# Patient Record
Sex: FEMALE | Race: BLACK OR AFRICAN AMERICAN | Employment: UNEMPLOYED | URBAN - METROPOLITAN AREA
[De-identification: names, ages, dates, MRNs, and addresses within clinical notes are randomized per-mention and may not be internally consistent; named-entity substitution may affect disease eponyms.]

---

## 2021-07-03 ENCOUNTER — HOSPITAL ENCOUNTER (EMERGENCY)
Age: 61
Discharge: HOME | End: 2021-07-03
Attending: EMERGENCY MEDICINE
Payer: COMMERCIAL

## 2021-07-03 VITALS
BODY MASS INDEX: 23.54 KG/M2 | SYSTOLIC BLOOD PRESSURE: 157 MMHG | HEIGHT: 67 IN | DIASTOLIC BLOOD PRESSURE: 77 MMHG | OXYGEN SATURATION: 99 % | TEMPERATURE: 97.9 F | WEIGHT: 150 LBS | HEART RATE: 65 BPM | RESPIRATION RATE: 15 BRPM

## 2021-07-03 DIAGNOSIS — M25.512 ACUTE PAIN OF LEFT SHOULDER: Primary | ICD-10-CM

## 2021-07-03 PROCEDURE — 99283 EMERGENCY DEPT VISIT LOW MDM: CPT

## 2021-07-03 PROCEDURE — 93005 ELECTROCARDIOGRAM TRACING: CPT | Performed by: PHYSICIAN ASSISTANT

## 2021-07-03 NOTE — ED PROVIDER NOTES
79-year-old female who presents to emergency room with chief complaint of left shoulder pain. She reports 4 days ago she was doing some yard work, has had some increasing shoulder pain since that time. She has been using 800 mg of ibuprofen every 4-6 hours with improvement of her symptoms. 6 she will be evaluated today because she woke up with worsening pain. Pain is located over her left shoulder blade. There is no associated trauma or falls. Denies fever, chills, chest pain, shortness of breath, radiating pain, palpitations, dizziness. The history is provided by the patient. Shoulder Pain          No past medical history on file. No past surgical history on file. No family history on file. Social History     Socioeconomic History    Marital status: LEGALLY      Spouse name: Not on file    Number of children: Not on file    Years of education: Not on file    Highest education level: Not on file   Occupational History    Not on file   Tobacco Use    Smoking status: Not on file   Substance and Sexual Activity    Alcohol use: Not on file    Drug use: Not on file    Sexual activity: Not on file   Other Topics Concern    Not on file   Social History Narrative    Not on file     Social Determinants of Health     Financial Resource Strain:     Difficulty of Paying Living Expenses:    Food Insecurity:     Worried About Running Out of Food in the Last Year:     [address] in the Last Year:    Transportation Needs:     Lack of Transportation (Medical):      Lack of Transportation (Non-Medical):    Physical Activity:     Days of Exercise per Week:     Minutes of Exercise per Session:    Stress:     Feeling of Stress :    Social Connections:     Frequency of Communication with Friends and Family:     Frequency of Social Gatherings with Friends and Family:     Attends Spiritism Services:     Active Member of Clubs or Organizations:     Attends Club or Organization Meetings:     Marital Status:    Intimate Partner Violence:     Fear of Current or Ex-Partner:     Emotionally Abused:     Physically Abused:     Sexually Abused: ALLERGIES: Patient has no known allergies. Review of Systems   Constitutional: Negative for chills and fever. HENT: Negative for facial swelling. Respiratory: Negative for chest tightness and shortness of breath. Cardiovascular: Negative for chest pain. Gastrointestinal: Negative for abdominal pain, nausea and vomiting. Musculoskeletal: Positive for arthralgias. Negative for myalgias. Neurological: Negative for headaches. Psychiatric/Behavioral: Negative for confusion. All other systems reviewed and are negative. Vitals:    07/03/21 0956   BP: 137/82   Pulse: 60   Resp: 16   Temp: 97.9 °F (36.6 °C)   SpO2: 100%   Weight: 68 kg (150 lb)   Height: 5' 7\" (1.702 m)            Physical Exam  Vitals and nursing note reviewed. Constitutional:       Appearance: Normal appearance. HENT:      Head: Normocephalic and atraumatic. Mouth/Throat:      Mouth: Mucous membranes are moist.   Eyes:      Pupils: Pupils are equal, round, and reactive to light. Cardiovascular:      Rate and Rhythm: Normal rate and regular rhythm. Pulmonary:      Effort: No respiratory distress. Breath sounds: Normal breath sounds. Abdominal:      Palpations: Abdomen is soft. Tenderness: There is no abdominal tenderness. There is no guarding. Musculoskeletal:      Right shoulder: Normal.      Left shoulder: Tenderness and bony tenderness present. No swelling or deformity. Skin:     General: Skin is warm and dry. Neurological:      Mental Status: She is alert and oriented to person, place, and time. Mental status is at baseline.    Psychiatric:         Mood and Affect: Mood normal.          MDM  Number of Diagnoses or Management Options  Diagnosis management comments: 62-year-old female who presented after yard work several days ago, now has left shoulder pain. States she was worried because she had low blood dry heaving this morning. She is no identifiable risk factors for ACS. Today in the emergency room her EKG is unremarkable with no ischemic changes, no T wave inversion, no ST elevation or depression. Minimal concern for ACS in this patient. Patient has been using Tylenol at home with improvement of her symptoms. Advise continue treatment of Tylenol, ibuprofen, she states she has muscle relaxers at home. Advised to follow-up with her primary care physician. Risk of Complications, Morbidity, and/or Mortality  Presenting problems: low  Diagnostic procedures: low  Management options: low           EKG    Date/Time: 7/3/2021 10:17 AM  Performed by: WILD Castillo  Authorized by:  WILD Castillo     Interpretation:     Interpretation: normal    Rate:     ECG rate:  59    ECG rate assessment: normal    Rhythm:     Rhythm: sinus bradycardia    Ectopy:     Ectopy: none    QRS:     QRS axis:  Normal

## 2021-07-03 NOTE — ED TRIAGE NOTES
Pt ambulatory to triage. Pt states her left shoulder, ribs, and mid back have pain x few days. Denies fall or injury. Pt states iburprofen 800mg helped to ease pain. Pt denies any exacerbating factors. Pt describes as pulling and ache. Denies cough, fever, SOB.

## 2021-07-03 NOTE — ED NOTES
I have reviewed discharge instructions with the patient. The patient verbalized understanding. Patient left ED via Discharge Method: ambulatory to Home with self. Opportunity for questions and clarification provided. Patient given 0 scripts. To continue your aftercare when you leave the hospital, you may receive an automated call from our care team to check in on how you are doing. This is a free service and part of our promise to provide the best care and service to meet your aftercare needs.  If you have questions, or wish to unsubscribe from this service please call [phone]. Thank you for Choosing our [address] [place] [address] Emergency Department.

## 2021-07-04 LAB
ATRIAL RATE: 59 BPM
CALCULATED P AXIS, ECG09: 85 DEGREES
CALCULATED R AXIS, ECG10: 52 DEGREES
CALCULATED T AXIS, ECG11: 65 DEGREES
DIAGNOSIS, 93000: NORMAL
P-R INTERVAL, ECG05: 124 MS
Q-T INTERVAL, ECG07: 414 MS
QRS DURATION, ECG06: 76 MS
QTC CALCULATION (BEZET), ECG08: 409 MS
VENTRICULAR RATE, ECG03: 59 BPM

## 2022-04-29 ENCOUNTER — TRANSCRIBE ORDER (OUTPATIENT)
Dept: SCHEDULING | Age: 62
End: 2022-04-29

## 2022-05-25 ENCOUNTER — HOSPITAL ENCOUNTER (EMERGENCY)
Dept: GENERAL RADIOLOGY | Age: 62
Discharge: HOME | End: 2022-05-28
Payer: COMMERCIAL

## 2022-05-25 ENCOUNTER — HOSPITAL ENCOUNTER (EMERGENCY)
Age: 62
Discharge: HOME | End: 2022-05-25
Attending: EMERGENCY MEDICINE
Payer: COMMERCIAL

## 2022-05-25 VITALS
TEMPERATURE: 98.6 F | SYSTOLIC BLOOD PRESSURE: 128 MMHG | DIASTOLIC BLOOD PRESSURE: 69 MMHG | RESPIRATION RATE: 16 BRPM | HEART RATE: 63 BPM | OXYGEN SATURATION: 100 %

## 2022-05-25 DIAGNOSIS — M25.511 ACUTE PAIN OF RIGHT SHOULDER: ICD-10-CM

## 2022-05-25 DIAGNOSIS — S16.1XXA STRAIN OF NECK MUSCLE, INITIAL ENCOUNTER: ICD-10-CM

## 2022-05-25 DIAGNOSIS — R07.81 RIB PAIN ON RIGHT SIDE: ICD-10-CM
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
## 2022-05-25 PROCEDURE — 71101 X-RAY EXAM UNILAT RIBS/CHEST: CPT

## 2022-05-25 PROCEDURE — 73030 X-RAY EXAM OF SHOULDER: CPT

## 2022-05-25 PROCEDURE — 72040 X-RAY EXAM NECK SPINE 2-3 VW: CPT

## 2022-05-25 PROCEDURE — 99283 EMERGENCY DEPT VISIT LOW MDM: CPT

## 2022-05-25 RX ORDER — METHOCARBAMOL 750 MG/1
750 TABLET, FILM COATED ORAL 4 TIMES DAILY
Qty: 21 TABLET | Refills: 0 | Status: SHIPPED | OUTPATIENT
Start: 2022-05-25 | End: 2022-06-01

## 2022-05-25 ASSESSMENT — PAIN DESCRIPTION - LOCATION: LOCATION: NECK

## 2022-05-25 ASSESSMENT — PAIN SCALES - GENERAL: PAINLEVEL_OUTOF10: 6

## 2022-05-25 ASSESSMENT — PAIN - FUNCTIONAL ASSESSMENT: PAIN_FUNCTIONAL_ASSESSMENT: 0-10

## 2022-05-25 NOTE — ED NOTES
I have reviewed discharge instructions with the patient. The patient verbalized understanding. Patient left ED via Discharge Method: ambulatory to Home with self. Opportunity for questions and clarification provided. Patient given 1 scripts. To continue your aftercare when you leave the hospital, you may receive an automated call from our care team to check in on how you are doing. This is a free service and part of our promise to provide the best care and service to meet your aftercare needs.  If you have questions, or wish to unsubscribe from this service please call [phone]. Thank you for Choosing our [facility] Emergency Department.         Ilsa Yin, [address] [place] [address]  05/25/22 1535
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
## 2022-07-13 ENCOUNTER — OFFICE VISIT (OUTPATIENT)
Dept: ORTHOPEDIC SURGERY | Age: 62
End: 2022-07-13
Payer: COMMERCIAL

## 2022-07-13 DIAGNOSIS — M79.671 RIGHT FOOT PAIN: Primary | ICD-10-CM

## 2022-07-13 DIAGNOSIS — M20.21 HALLUX RIGIDUS OF RIGHT FOOT: ICD-10-CM

## 2022-07-13 PROCEDURE — 99214 OFFICE O/P EST MOD 30 MIN: CPT | Performed by: ORTHOPAEDIC SURGERY
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
## 2023-03-10 ENCOUNTER — HOSPITAL ENCOUNTER (EMERGENCY)
Age: 63
Discharge: HOME | End: 2023-03-10
Attending: EMERGENCY MEDICINE
Payer: COMMERCIAL

## 2023-03-10 VITALS
HEART RATE: 73 BPM | WEIGHT: 145 LBS | DIASTOLIC BLOOD PRESSURE: 77 MMHG | SYSTOLIC BLOOD PRESSURE: 156 MMHG | TEMPERATURE: 98.4 F | BODY MASS INDEX: 21.98 KG/M2 | RESPIRATION RATE: 18 BRPM | HEIGHT: 68 IN | OXYGEN SATURATION: 100 %

## 2023-03-10 DIAGNOSIS — R30.0 DYSURIA: Primary | ICD-10-CM

## 2023-03-10 DIAGNOSIS — M54.50 ACUTE BILATERAL LOW BACK PAIN WITHOUT SCIATICA: ICD-10-CM

## 2023-03-10 LAB
ALBUMIN SERPL-MCNC: 3.9 G/DL (ref 3.2–4.6)
ALBUMIN/GLOB SERPL: 1 (ref 0.4–1.6)
ALP SERPL-CCNC: 82 U/L (ref 50–136)
ALT SERPL-CCNC: 21 U/L (ref 12–65)
ANION GAP SERPL CALC-SCNC: 6 MMOL/L (ref 2–11)
APPEARANCE UR: CLEAR
AST SERPL-CCNC: 14 U/L (ref 15–37)
BACTERIA URNS QL MICRO: NEGATIVE /HPF
BASOPHILS # BLD: 0 K/UL (ref 0–0.2)
BASOPHILS NFR BLD: 1 % (ref 0–2)
BILIRUB SERPL-MCNC: 0.8 MG/DL (ref 0.2–1.1)
BILIRUB UR QL: NEGATIVE
BILIRUB UR QL: NEGATIVE
BUN SERPL-MCNC: 14 MG/DL (ref 8–23)
CALCIUM SERPL-MCNC: 9 MG/DL (ref 8.3–10.4)
CASTS URNS QL MICRO: ABNORMAL /LPF
CHLORIDE SERPL-SCNC: 108 MMOL/L (ref 101–110)
CO2 SERPL-SCNC: 28 MMOL/L (ref 21–32)
COLOR UR: ABNORMAL
CREAT SERPL-MCNC: 0.9 MG/DL (ref 0.6–1)
DIFFERENTIAL METHOD BLD: ABNORMAL
EOSINOPHIL # BLD: 0.1 K/UL (ref 0–0.8)
EOSINOPHIL NFR BLD: 2 % (ref 0.5–7.8)
EPI CELLS #/AREA URNS HPF: ABNORMAL /HPF
ERYTHROCYTE [DISTWIDTH] IN BLOOD BY AUTOMATED COUNT: 12.4 % (ref 11.9–14.6)
GLOBULIN SER CALC-MCNC: 4 G/DL (ref 2.8–4.5)
GLUCOSE SERPL-MCNC: 96 MG/DL (ref 65–100)
GLUCOSE UR QL STRIP.AUTO: NEGATIVE MG/DL
GLUCOSE UR STRIP.AUTO-MCNC: NEGATIVE MG/DL
HCT VFR BLD AUTO: 43.4 % (ref 35.8–46.3)
HGB BLD-MCNC: 13.6 G/DL (ref 11.7–15.4)
HGB UR QL STRIP: NEGATIVE
IMM GRANULOCYTES # BLD AUTO: 0 K/UL (ref 0–0.5)
IMM GRANULOCYTES NFR BLD AUTO: 0 % (ref 0–5)
KETONES UR QL STRIP.AUTO: NEGATIVE MG/DL
KETONES UR-MCNC: NEGATIVE MG/DL
LEUKOCYTE ESTERASE UR QL STRIP.AUTO: ABNORMAL
LEUKOCYTE ESTERASE UR QL STRIP: ABNORMAL
LYMPHOCYTES # BLD: 2.5 K/UL (ref 0.5–4.6)
LYMPHOCYTES NFR BLD: 47 % (ref 13–44)
MCH RBC QN AUTO: 30.9 PG (ref 26.1–32.9)
MCHC RBC AUTO-ENTMCNC: 31.3 G/DL (ref 31.4–35)
MCV RBC AUTO: 98.6 FL (ref 82–102)
MONOCYTES # BLD: 0.3 K/UL (ref 0.1–1.3)
MONOCYTES NFR BLD: 6 % (ref 4–12)
NEUTS SEG # BLD: 2.3 K/UL (ref 1.7–8.2)
NEUTS SEG NFR BLD: 44 % (ref 43–78)
NITRITE UR QL STRIP.AUTO: NEGATIVE
NITRITE UR QL: NEGATIVE
NRBC # BLD: 0 K/UL (ref 0–0.2)
PH UR STRIP: 7 (ref 5–9)
PH UR: 7 (ref 5–9)
PLATELET # BLD AUTO: 214 K/UL (ref 150–450)
PMV BLD AUTO: 9.2 FL (ref 9.4–12.3)
POTASSIUM SERPL-SCNC: 4.2 MMOL/L (ref 3.5–5.1)
PROT SERPL-MCNC: 7.9 G/DL (ref 6.3–8.2)
PROT UR QL: NEGATIVE MG/DL
PROT UR STRIP-MCNC: NEGATIVE MG/DL
RBC # BLD AUTO: 4.4 M/UL (ref 4.05–5.2)
RBC # UR STRIP: ABNORMAL
RBC #/AREA URNS HPF: ABNORMAL /HPF
SERVICE CMNT-IMP: ABNORMAL
SODIUM SERPL-SCNC: 142 MMOL/L (ref 133–143)
SP GR UR REFRACTOMETRY: 1.01 (ref 1–1.02)
SP GR UR: 1.01 (ref 1–1.02)
UROBILINOGEN UR QL STRIP.AUTO: 0.2 EU/DL (ref 0.2–1)
UROBILINOGEN UR QL: 0.2 EU/DL (ref 0.2–1)
WBC # BLD AUTO: 5.2 K/UL (ref 4.3–11.1)
WBC URNS QL MICRO: ABNORMAL /HPF

## 2023-03-10 PROCEDURE — 80053 COMPREHEN METABOLIC PANEL: CPT

## 2023-03-10 PROCEDURE — 87086 URINE CULTURE/COLONY COUNT: CPT

## 2023-03-10 PROCEDURE — 85025 COMPLETE CBC W/AUTO DIFF WBC: CPT

## 2023-03-10 PROCEDURE — 99283 EMERGENCY DEPT VISIT LOW MDM: CPT

## 2023-03-10 PROCEDURE — 81001 URINALYSIS AUTO W/SCOPE: CPT

## 2023-03-10 PROCEDURE — 81003 URINALYSIS AUTO W/O SCOPE: CPT

## 2023-03-10 PROCEDURE — 6370000000 HC RX 637 (ALT 250 FOR IP): Performed by: EMERGENCY MEDICINE

## 2023-03-10 RX ORDER — PHENAZOPYRIDINE HYDROCHLORIDE 100 MG/1
100 TABLET, FILM COATED ORAL 3 TIMES DAILY PRN
Qty: 9 TABLET | Refills: 0 | Status: SHIPPED | OUTPATIENT
Start: 2023-03-10 | End: 2023-03-13

## 2023-03-10 RX ORDER — PHENAZOPYRIDINE HYDROCHLORIDE 95 MG/1
95 TABLET ORAL
Status: COMPLETED | OUTPATIENT
Start: 2023-03-10 | End: 2023-03-10

## 2023-03-10 RX ORDER — CEPHALEXIN 500 MG/1
500 CAPSULE ORAL
Status: COMPLETED | OUTPATIENT
Start: 2023-03-10 | End: 2023-03-10

## 2023-03-10 RX ORDER — CEPHALEXIN 500 MG/1
500 CAPSULE ORAL 3 TIMES DAILY
Qty: 21 CAPSULE | Refills: 0 | Status: SHIPPED | OUTPATIENT
Start: 2023-03-10 | End: 2023-03-17

## 2023-03-10 RX ADMIN — CEPHALEXIN 500 MG: 500 CAPSULE ORAL at 11:14

## 2023-03-10 RX ADMIN — PHENAZOPYRIDINE HYDROCHLORIDE 95 MG: 95 TABLET ORAL at 11:14

## 2023-03-10 ASSESSMENT — PAIN SCALES - GENERAL: PAINLEVEL_OUTOF10: 8

## 2023-03-10 ASSESSMENT — PAIN - FUNCTIONAL ASSESSMENT: PAIN_FUNCTIONAL_ASSESSMENT: 0-10

## 2023-03-10 ASSESSMENT — ENCOUNTER SYMPTOMS: ABDOMINAL PAIN: 1

## 2023-03-10 NOTE — ED TRIAGE NOTES
Pt ambulatory to triage with CO bilateral low back pain without injury, radiating to abdomen and groin. CO dysuria x 1 week. Reports nausea no vomiting or diarrhea.  Denies fevers/chills

## 2023-03-10 NOTE — ED NOTES
I have reviewed discharge instructions with the patient and caregiver. The patient and caregiver verbalized understanding. Patient left ED via Discharge Method: ambulatory to Home with daughter. Opportunity for questions and clarification provided. Patient given 2 scripts. To continue your aftercare when you leave the hospital, you may receive an automated call from our care team to check in on how you are doing. This is a free service and part of our promise to provide the best care and service to meet your aftercare needs.  If you have questions, or wish to unsubscribe from this service please call [phone]. Thank you for Choosing our [facility] Emergency Department.         Darwin Pantoja RN  03/10/23 6586

## 2023-03-10 NOTE — ED PROVIDER NOTES
Emergency Department Provider Note                   PCP:                On File Not (Inactive)               Age: 59 y.o. Sex: female     DISPOSITION       No diagnosis found. MEDICAL DECISION MAKING  Complexity of Problems Addressed:  {Complexity:06317}    Data Reviewed and Analyzed:  Category 1:   {external source:35281}  I ordered each unique test.  I reviewed the results of each unique test.    {Historian (state who, why needed, what they said):77122}    Category 2:   {test reviewed:27967}    Category 3: Discussion of management or test interpretation. ***       Risk of Complications and/or Morbidity of Patient Management:  {CWGE:90554}     Igor Knapp is a 59 y.o. female who presents to the Emergency Department with chief complaint of    Chief Complaint   Patient presents with    Abdominal Pain      Here with lower abdominal pain that radiates into her upper legs. This been ongoing now for 5 to 7 days. No fever associated with this. No GI changes such as changes stool blood or mucus has been noted. Has some urinary frequency as her dominant urinary symptom. No fevers or shaking chills. Abdominal distention. No history of abdominal surgeries. No GYN surgeries. Is baseline very healthy and other than vitamins and supplements she is on no daily medications. Works at [employer]    The history is provided by the patient. Abdominal Pain  Pain location: Across lower abdomen and upper thighs. Pain radiation: Upper legs. Pain severity:  Moderate  Duration: Approximately 1 week. Context: not alcohol use, not recent illness, not retching and not suspicious food intake    Relieved by:  Nothing  Worsened by:  Nothing  Ineffective treatments: Motrin. Associated symptoms: dysuria    Associated symptoms: no chills, no fever and no hematuria       Review of Systems   Constitutional:  Negative for chills and fever. Gastrointestinal:  Positive for abdominal pain.    Genitourinary:  Positive for dysuria. Negative for frequency, hematuria and urgency. All other systems reviewed and are negative. Vitals signs and nursing note reviewed. Patient Vitals for the past 4 hrs:   Temp Pulse Resp BP SpO2   03/10/23 0721 98.4 °F (36.9 °C) 73 18 (!) 148/89 100 %          Physical Exam  Vitals and nursing note reviewed. Constitutional:       Comments: Pleasant/fit appearing in no apparent acute distress with no toxic appearing   HENT:      Head: Atraumatic. Right Ear: External ear normal.      Left Ear: External ear normal.      Nose: Nose normal.   Eyes:      General: No scleral icterus. Cardiovascular:      Rate and Rhythm: Normal rate and regular rhythm. Pulmonary:      Effort: Pulmonary effort is normal.      Breath sounds: Normal breath sounds. No wheezing. Abdominal:      General: Abdomen is flat. Bowel sounds are normal.      Palpations: Abdomen is soft. Tenderness: There is no right CVA tenderness, left CVA tenderness, guarding or rebound. Comments: Benign exam   Skin:     General: Skin is warm. Neurological:      General: No focal deficit present. Mental Status: She is alert. Psychiatric:         Behavior: Behavior normal.        Procedures     Orders Placed This Encounter   Procedures    Urinalysis w rflx microscopic    CBC with Auto Differential    CMP    POCT Urine Dipstick    POCT Urinalysis no Micro        Medications - No data to display    New Prescriptions    No medications on file        No past medical history on file. No past surgical history on file. No family history on file. Social History     Socioeconomic History    Marital status: Legally         Allergies: Patient has no known allergies.     Previous Medications    No medications on file        Results for orders placed or performed during the hospital encounter of 03/10/23   POCT Urinalysis no Micro   Result Value Ref Range    Specific Gravity, Urine, POC 1.015 1.001 - 1.023 pH, Urine, POC 7.0 5.0 - 9.0      Protein, Urine, POC Negative NEG mg/dL    Glucose, UA POC Negative NEG mg/dL    Ketones, Urine, POC Negative NEG mg/dL    Bilirubin, Urine, POC Negative NEG      Blood, UA POC Trace Intact (A) NEG      URINE UROBILINOGEN POC 0.2 0.2 - 1.0 EU/dL    Nitrite, Urine, POC Negative NEG      Leukocyte Est, UA POC SMALL (A) NEG      Performed by: Annemarie Patel         No orders to display                     Voice dictation software was used during the making of this note. This software is not perfect and grammatical and other typographical errors may be present. This note has not been completely proofread for errors.

## 2023-03-10 NOTE — Clinical Note
Mary Ann Clark was seen and treated in our emergency department on 3/10/2023.  She may return to work on 03/11/2023.       If you have any questions or concerns, please don't hesitate to call.      Oliverio Cuenca MD

## 2023-03-10 NOTE — DISCHARGE INSTRUCTIONS
Over-the-counter medications for discomfort such as ibuprofen and/or acetaminophen  Extra fluids  Antibiotic: Keflex  Discomfort on urination medicine: Pyridium  We are treating a urinary tract possibility though not definitive at this point

## 2023-03-12 LAB
BACTERIA SPEC CULT: NORMAL
SERVICE CMNT-IMP: NORMAL

## 2023-03-26 SDOH — ECONOMIC STABILITY: INCOME INSECURITY: HOW HARD IS IT FOR YOU TO PAY FOR THE VERY BASICS LIKE FOOD, HOUSING, MEDICAL CARE, AND HEATING?: NOT HARD AT ALL

## 2023-03-26 SDOH — ECONOMIC STABILITY: TRANSPORTATION INSECURITY
IN THE PAST 12 MONTHS, HAS LACK OF TRANSPORTATION KEPT YOU FROM MEETINGS, WORK, OR FROM GETTING THINGS NEEDED FOR DAILY LIVING?: NO

## 2023-03-26 SDOH — ECONOMIC STABILITY: HOUSING INSECURITY
IN THE LAST 12 MONTHS, WAS THERE A TIME WHEN YOU DID NOT HAVE A STEADY PLACE TO SLEEP OR SLEPT IN A SHELTER (INCLUDING NOW)?: NO

## 2023-03-26 SDOH — ECONOMIC STABILITY: FOOD INSECURITY: WITHIN THE PAST 12 MONTHS, YOU WORRIED THAT YOUR FOOD WOULD RUN OUT BEFORE YOU GOT MONEY TO BUY MORE.: NEVER TRUE

## 2023-03-26 SDOH — ECONOMIC STABILITY: FOOD INSECURITY: WITHIN THE PAST 12 MONTHS, THE FOOD YOU BOUGHT JUST DIDN'T LAST AND YOU DIDN'T HAVE MONEY TO GET MORE.: NEVER TRUE

## 2023-03-27 ENCOUNTER — OFFICE VISIT (OUTPATIENT)
Dept: OBGYN CLINIC | Age: 63
End: 2023-03-27
Payer: COMMERCIAL

## 2023-03-27 VITALS
BODY MASS INDEX: 21.37 KG/M2 | HEIGHT: 68 IN | WEIGHT: 141 LBS | SYSTOLIC BLOOD PRESSURE: 136 MMHG | DIASTOLIC BLOOD PRESSURE: 80 MMHG

## 2023-03-27 DIAGNOSIS — Z12.31 ENCOUNTER FOR SCREENING MAMMOGRAM FOR MALIGNANT NEOPLASM OF BREAST: ICD-10-CM

## 2023-03-27 DIAGNOSIS — Z01.419 ENCOUNTER FOR WELL WOMAN EXAM WITH ROUTINE GYNECOLOGICAL EXAM: ICD-10-CM

## 2023-03-27 DIAGNOSIS — Z12.4 PAP SMEAR FOR CERVICAL CANCER SCREENING: ICD-10-CM

## 2023-03-27 DIAGNOSIS — N39.0 URINARY TRACT INFECTION WITHOUT HEMATURIA, SITE UNSPECIFIED: Primary | ICD-10-CM

## 2023-03-27 DIAGNOSIS — Z11.51 SCREENING FOR HPV (HUMAN PAPILLOMAVIRUS): ICD-10-CM

## 2023-03-27 PROCEDURE — 99386 PREV VISIT NEW AGE 40-64: CPT | Performed by: OBSTETRICS & GYNECOLOGY

## 2023-03-27 NOTE — PROGRESS NOTES
for screening mammogram for malignant neoplasm of breast    3. Pap smear for cervical cancer screening    4. Screening for HPV (human papillomavirus)    5. Encounter for well woman exam with routine gynecological exam      Orders Placed This Encounter   Procedures    Culture, Urine    RONY DIGITAL SCREEN W OR WO CAD BILATERAL    PAP IG, Aptima HPV and rfx 16/18,45 ([id number])     No orders of the defined types were placed in this encounter. Routine age-appropriate well woman counseling was performed. Pt advised to see her PCP for any non-gyn complaints as noted above.      Return in about 1 year (around 3/27/2024), or if symptoms worsen or fail to improve, for Annual exam.

## 2023-03-30 ENCOUNTER — HOSPITAL ENCOUNTER (OUTPATIENT)
Dept: GENERAL RADIOLOGY | Age: 63
Discharge: HOME | End: 2023-03-30
Payer: COMMERCIAL

## 2023-03-30 ENCOUNTER — OFFICE VISIT (OUTPATIENT)
Dept: FAMILY MEDICINE CLINIC | Facility: CLINIC | Age: 63
End: 2023-03-30
Payer: COMMERCIAL

## 2023-03-30 VITALS
TEMPERATURE: 98.5 F | OXYGEN SATURATION: 98 % | SYSTOLIC BLOOD PRESSURE: 130 MMHG | BODY MASS INDEX: 21.61 KG/M2 | HEIGHT: 68 IN | DIASTOLIC BLOOD PRESSURE: 70 MMHG | WEIGHT: 142.6 LBS | HEART RATE: 66 BPM

## 2023-03-30 DIAGNOSIS — D22.9 ATYPICAL NEVI: ICD-10-CM

## 2023-03-30 DIAGNOSIS — R29.898 ABNORMAL JOINT ON EXAMINATION: ICD-10-CM

## 2023-03-30 DIAGNOSIS — Z78.0 POSTMENOPAUSE: ICD-10-CM

## 2023-03-30 DIAGNOSIS — Z11.59 ENCOUNTER FOR HEPATITIS C SCREENING TEST FOR LOW RISK PATIENT: ICD-10-CM

## 2023-03-30 DIAGNOSIS — D69.6 THROMBOCYTOPENIA (HCC): Primary | ICD-10-CM

## 2023-03-30 DIAGNOSIS — Z13.220 SCREENING CHOLESTEROL LEVEL: ICD-10-CM

## 2023-03-30 DIAGNOSIS — R29.898 ABNORMAL JOINT ON EXAMINATION: Primary | ICD-10-CM

## 2023-03-30 LAB
BACTERIA SPEC CULT: NORMAL
BASOPHILS # BLD: 0.1 K/UL (ref 0–0.2)
BASOPHILS NFR BLD: 1 % (ref 0–2)
DIFFERENTIAL METHOD BLD: ABNORMAL
EOSINOPHIL # BLD: 0.1 K/UL (ref 0–0.8)
EOSINOPHIL NFR BLD: 2 % (ref 0.5–7.8)
ERYTHROCYTE [DISTWIDTH] IN BLOOD BY AUTOMATED COUNT: 12.3 % (ref 11.9–14.6)
HCT VFR BLD AUTO: 39.3 % (ref 35.8–46.3)
HGB BLD-MCNC: 12.3 G/DL (ref 11.7–15.4)
IMM GRANULOCYTES # BLD AUTO: 0 K/UL (ref 0–0.5)
IMM GRANULOCYTES NFR BLD AUTO: 0 % (ref 0–5)
LYMPHOCYTES # BLD: 2.3 K/UL (ref 0.5–4.6)
LYMPHOCYTES NFR BLD: 46 % (ref 13–44)
MCH RBC QN AUTO: 31.3 PG (ref 26.1–32.9)
MCHC RBC AUTO-ENTMCNC: 31.3 G/DL (ref 31.4–35)
MCV RBC AUTO: 100 FL (ref 82–102)
MONOCYTES # BLD: 0.4 K/UL (ref 0.1–1.3)
MONOCYTES NFR BLD: 8 % (ref 4–12)
NEUTS SEG # BLD: 2.2 K/UL (ref 1.7–8.2)
NEUTS SEG NFR BLD: 43 % (ref 43–78)
NRBC # BLD: 0 K/UL (ref 0–0.2)
PLATELET # BLD AUTO: 67 K/UL (ref 150–450)
PLATELET COMMENT: ABNORMAL
PMV BLD AUTO: 11.1 FL (ref 9.4–12.3)
RBC # BLD AUTO: 3.93 M/UL (ref 4.05–5.2)
SERVICE CMNT-IMP: NORMAL
WBC # BLD AUTO: 5.1 K/UL (ref 4.3–11.1)

## 2023-03-30 PROCEDURE — 99203 OFFICE O/P NEW LOW 30 MIN: CPT | Performed by: NURSE PRACTITIONER

## 2023-03-30 PROCEDURE — 73130 X-RAY EXAM OF HAND: CPT

## 2023-03-30 SDOH — ECONOMIC STABILITY: INCOME INSECURITY: HOW HARD IS IT FOR YOU TO PAY FOR THE VERY BASICS LIKE FOOD, HOUSING, MEDICAL CARE, AND HEATING?: NOT HARD AT ALL

## 2023-03-30 SDOH — ECONOMIC STABILITY: FOOD INSECURITY: WITHIN THE PAST 12 MONTHS, THE FOOD YOU BOUGHT JUST DIDN'T LAST AND YOU DIDN'T HAVE MONEY TO GET MORE.: NEVER TRUE

## 2023-03-30 SDOH — HEALTH STABILITY: PHYSICAL HEALTH: ON AVERAGE, HOW MANY DAYS PER WEEK DO YOU ENGAGE IN MODERATE TO STRENUOUS EXERCISE (LIKE A BRISK WALK)?: 0 DAYS

## 2023-03-30 SDOH — HEALTH STABILITY: PHYSICAL HEALTH: ON AVERAGE, HOW MANY MINUTES DO YOU ENGAGE IN EXERCISE AT THIS LEVEL?: 0 MIN

## 2023-03-30 SDOH — ECONOMIC STABILITY: FOOD INSECURITY: WITHIN THE PAST 12 MONTHS, YOU WORRIED THAT YOUR FOOD WOULD RUN OUT BEFORE YOU GOT MONEY TO BUY MORE.: NEVER TRUE

## 2023-03-30 ASSESSMENT — PATIENT HEALTH QUESTIONNAIRE - PHQ9
SUM OF ALL RESPONSES TO PHQ QUESTIONS 1-9: 0
1. LITTLE INTEREST OR PLEASURE IN DOING THINGS: 0
SUM OF ALL RESPONSES TO PHQ9 QUESTIONS 1 & 2: 0
SUM OF ALL RESPONSES TO PHQ QUESTIONS 1-9: 0
2. FEELING DOWN, DEPRESSED OR HOPELESS: 0
SUM OF ALL RESPONSES TO PHQ QUESTIONS 1-9: 0
SUM OF ALL RESPONSES TO PHQ QUESTIONS 1-9: 0

## 2023-03-30 ASSESSMENT — ENCOUNTER SYMPTOMS
EYES NEGATIVE: 1
RESPIRATORY NEGATIVE: 1
GASTROINTESTINAL NEGATIVE: 1
ALLERGIC/IMMUNOLOGIC NEGATIVE: 1

## 2023-03-30 NOTE — PROGRESS NOTES
Allergic/Immunologic: Negative. Neurological: Negative. Hematological: Negative. Psychiatric/Behavioral: Negative. Vitals:    03/30/23 1406   BP: 130/70   Pulse: 66   Temp: 98.5 °F (36.9 °C)   SpO2: 98%        Physical Exam  Constitutional:       Appearance: Normal appearance. HENT:      Head: Normocephalic. Nose: Nose normal.   Eyes:      Extraocular Movements: Extraocular movements intact. Pupils: Pupils are equal, round, and reactive to light. Cardiovascular:      Rate and Rhythm: Normal rate and regular rhythm. Pulmonary:      Effort: Pulmonary effort is normal.      Breath sounds: Normal breath sounds. Abdominal:      General: Abdomen is flat. Palpations: Abdomen is soft. Musculoskeletal:         General: Deformity (right ring finger joint) present. Normal range of motion. Cervical back: Normal range of motion and neck supple. Skin:     General: Skin is warm and dry. Comments: Pt has several skin lesions on back and one on right lower leg. Appears to be a nevi on leg; however, will refer to derm. Neurological:      General: No focal deficit present. Mental Status: She is alert and oriented to person, place, and time. Psychiatric:         Mood and Affect: Mood normal.         Behavior: Behavior normal.          PHQ Scores 3/30/2023   PHQ2 Score 0   PHQ9 Score 0        Assessment & Plan:    1. Abnormal joint on examination  - XR HAND RIGHT (MIN 3 VIEWS); Future    2. Atypical nevi  - [facility]    3. Screening cholesterol level  - Comprehensive Metabolic Panel; Future  - CBC with Auto Differential; Future  - Lipid Panel; Future    4. Postmenopause  - DEXA BONE DENSITY AXIAL SKELETON; Future    5. Encounter for hepatitis C screening test for low risk patient  - Hepatitis C Antibody; Future       Greater than 50% counseling and/or coordination of care: the treatment regimen is extensive; detailed review. Will notify of labs/xray.

## 2023-03-31 LAB
ALBUMIN SERPL-MCNC: 3.7 G/DL (ref 3.2–4.6)
ALBUMIN/GLOB SERPL: 1.1 (ref 0.4–1.6)
ALP SERPL-CCNC: 93 U/L (ref 50–136)
ALT SERPL-CCNC: 19 U/L (ref 12–65)
ANION GAP SERPL CALC-SCNC: 6 MMOL/L (ref 2–11)
AST SERPL-CCNC: 16 U/L (ref 15–37)
BILIRUB SERPL-MCNC: 0.5 MG/DL (ref 0.2–1.1)
BUN SERPL-MCNC: 12 MG/DL (ref 8–23)
CALCIUM SERPL-MCNC: 9.1 MG/DL (ref 8.3–10.4)
CHLORIDE SERPL-SCNC: 111 MMOL/L (ref 101–110)
CHOLEST SERPL-MCNC: 203 MG/DL
CO2 SERPL-SCNC: 25 MMOL/L (ref 21–32)
CREAT SERPL-MCNC: 1 MG/DL (ref 0.6–1)
GLOBULIN SER CALC-MCNC: 3.3 G/DL (ref 2.8–4.5)
GLUCOSE SERPL-MCNC: 84 MG/DL (ref 65–100)
HCV AB SER QL: NONREACTIVE
HDLC SERPL-MCNC: 81 MG/DL (ref 40–60)
HDLC SERPL: 2.5
LDLC SERPL CALC-MCNC: 96 MG/DL
POTASSIUM SERPL-SCNC: 4.3 MMOL/L (ref 3.5–5.1)
PROT SERPL-MCNC: 7 G/DL (ref 6.3–8.2)
SODIUM SERPL-SCNC: 142 MMOL/L (ref 133–143)
TRIGL SERPL-MCNC: 130 MG/DL (ref 35–150)
VLDLC SERPL CALC-MCNC: 26 MG/DL (ref 6–23)
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
## 2023-04-03 ENCOUNTER — HOSPITAL ENCOUNTER (OUTPATIENT)
Dept: MAMMOGRAPHY | Age: 63
Discharge: HOME | End: 2023-04-06
Payer: COMMERCIAL

## 2023-04-03 DIAGNOSIS — Z12.31 ENCOUNTER FOR SCREENING MAMMOGRAM FOR MALIGNANT NEOPLASM OF BREAST: ICD-10-CM

## 2023-04-03 PROCEDURE — 77067 SCR MAMMO BI INCL CAD: CPT

## 2023-04-05 LAB
CYTOLOGIST CVX/VAG CYTO: ABNORMAL
CYTOLOGY CVX/VAG DOC THIN PREP: ABNORMAL
HPV APTIMA: NEGATIVE
HPV GENOTYPE REFLEX: ABNORMAL
Lab: ABNORMAL
PATH REPORT.FINAL DX SPEC: ABNORMAL
PATHOLOGIST CVX/VAG CYTO: ABNORMAL
PATHOLOGIST PROVIDED ICD: ABNORMAL
RECOM F/U CVX/VAG CYTO: ABNORMAL
STAT OF ADQ CVX/VAG CYTO-IMP: ABNORMAL

## 2023-04-06 DIAGNOSIS — D69.6 THROMBOCYTOPENIA (HCC): ICD-10-CM

## 2023-04-06 LAB
BASOPHILS # BLD: 0.1 K/UL (ref 0–0.2)
BASOPHILS NFR BLD: 1 % (ref 0–2)
DIFFERENTIAL METHOD BLD: ABNORMAL
EOSINOPHIL # BLD: 0.1 K/UL (ref 0–0.8)
EOSINOPHIL NFR BLD: 2 % (ref 0.5–7.8)
ERYTHROCYTE [DISTWIDTH] IN BLOOD BY AUTOMATED COUNT: 12.6 % (ref 11.9–14.6)
HCT VFR BLD AUTO: 40.2 % (ref 35.8–46.3)
HGB BLD-MCNC: 12.4 G/DL (ref 11.7–15.4)
IMM GRANULOCYTES # BLD AUTO: 0 K/UL (ref 0–0.5)
IMM GRANULOCYTES NFR BLD AUTO: 0 % (ref 0–5)
LYMPHOCYTES # BLD: 2.4 K/UL (ref 0.5–4.6)
LYMPHOCYTES NFR BLD: 44 % (ref 13–44)
MCH RBC QN AUTO: 30.7 PG (ref 26.1–32.9)
MCHC RBC AUTO-ENTMCNC: 30.8 G/DL (ref 31.4–35)
MCV RBC AUTO: 99.5 FL (ref 82–102)
MONOCYTES # BLD: 0.4 K/UL (ref 0.1–1.3)
MONOCYTES NFR BLD: 8 % (ref 4–12)
NEUTS SEG # BLD: 2.5 K/UL (ref 1.7–8.2)
NEUTS SEG NFR BLD: 45 % (ref 43–78)
NRBC # BLD: 0 K/UL (ref 0–0.2)
PLATELET # BLD AUTO: 76 K/UL (ref 150–450)
PMV BLD AUTO: 11.2 FL (ref 9.4–12.3)
RBC # BLD AUTO: 4.04 M/UL (ref 4.05–5.2)
WBC # BLD AUTO: 5.5 K/UL (ref 4.3–11.1)
WBC MORPH BLD: ABNORMAL

## 2023-04-10 DIAGNOSIS — D69.6 THROMBOCYTOPENIA (HCC): Primary | ICD-10-CM

## 2023-04-11 DIAGNOSIS — D69.6 THROMBOCYTOPENIA (HCC): ICD-10-CM

## 2023-04-14 ENCOUNTER — HOSPITAL ENCOUNTER (EMERGENCY)
Age: 63
Discharge: HOME | End: 2023-04-14
Attending: EMERGENCY MEDICINE
Payer: COMMERCIAL

## 2023-04-14 VITALS
TEMPERATURE: 99.3 F | BODY MASS INDEX: 22.38 KG/M2 | DIASTOLIC BLOOD PRESSURE: 80 MMHG | HEART RATE: 91 BPM | SYSTOLIC BLOOD PRESSURE: 160 MMHG | WEIGHT: 145 LBS | OXYGEN SATURATION: 100 % | RESPIRATION RATE: 18 BRPM

## 2023-04-14 DIAGNOSIS — D69.6 THROMBOCYTOPENIA (HCC): Primary | ICD-10-CM

## 2023-04-14 LAB
ALBUMIN SERPL-MCNC: 4 G/DL (ref 3.2–4.6)
ALBUMIN/GLOB SERPL: 1.1 (ref 0.4–1.6)
ALP SERPL-CCNC: 93 U/L (ref 50–136)
ALT SERPL-CCNC: 23 U/L (ref 12–65)
ANION GAP SERPL CALC-SCNC: 3 MMOL/L (ref 2–11)
AST SERPL-CCNC: 20 U/L (ref 15–37)
BASOPHILS # BLD: 0.1 K/UL (ref 0–0.2)
BASOPHILS NFR BLD: 1 % (ref 0–2)
BILIRUB SERPL-MCNC: 0.6 MG/DL (ref 0.2–1.1)
BUN SERPL-MCNC: 12 MG/DL (ref 8–23)
CALCIUM SERPL-MCNC: 9.2 MG/DL (ref 8.3–10.4)
CHLORIDE SERPL-SCNC: 108 MMOL/L (ref 101–110)
CO2 SERPL-SCNC: 28 MMOL/L (ref 21–32)
CREAT SERPL-MCNC: 1 MG/DL (ref 0.6–1)
DIFFERENTIAL METHOD BLD: ABNORMAL
EOSINOPHIL # BLD: 0.1 K/UL (ref 0–0.8)
EOSINOPHIL NFR BLD: 2 % (ref 0.5–7.8)
ERYTHROCYTE [DISTWIDTH] IN BLOOD BY AUTOMATED COUNT: 12.5 % (ref 11.9–14.6)
GLOBULIN SER CALC-MCNC: 3.6 G/DL (ref 2.8–4.5)
GLUCOSE SERPL-MCNC: 107 MG/DL (ref 65–100)
HCT VFR BLD AUTO: 41.1 % (ref 35.8–46.3)
HGB BLD-MCNC: 13.1 G/DL (ref 11.7–15.4)
IMM GRANULOCYTES # BLD AUTO: 0 K/UL (ref 0–0.5)
IMM GRANULOCYTES NFR BLD AUTO: 0 % (ref 0–5)
LYMPHOCYTES # BLD: 2.4 K/UL (ref 0.5–4.6)
LYMPHOCYTES NFR BLD: 45 % (ref 13–44)
MCH RBC QN AUTO: 31.5 PG (ref 26.1–32.9)
MCHC RBC AUTO-ENTMCNC: 31.9 G/DL (ref 31.4–35)
MCV RBC AUTO: 98.8 FL (ref 82–102)
MONOCYTES # BLD: 0.4 K/UL (ref 0.1–1.3)
MONOCYTES NFR BLD: 7 % (ref 4–12)
NEUTS SEG # BLD: 2.3 K/UL (ref 1.7–8.2)
NEUTS SEG NFR BLD: 45 % (ref 43–78)
NRBC # BLD: 0 K/UL (ref 0–0.2)
PLATELET # BLD AUTO: 118 K/UL (ref 150–450)
PLATELET COMMENT: ABNORMAL
PMV BLD AUTO: 12.4 FL (ref 9.4–12.3)
POTASSIUM SERPL-SCNC: 4.1 MMOL/L (ref 3.5–5.1)
PROT SERPL-MCNC: 7.6 G/DL (ref 6.3–8.2)
RBC # BLD AUTO: 4.16 M/UL (ref 4.05–5.2)
RBC MORPH BLD: ABNORMAL
SODIUM SERPL-SCNC: 139 MMOL/L (ref 133–143)
WBC # BLD AUTO: 5.3 K/UL (ref 4.3–11.1)
WBC MORPH BLD: ABNORMAL

## 2023-04-14 PROCEDURE — 85025 COMPLETE CBC W/AUTO DIFF WBC: CPT

## 2023-04-14 PROCEDURE — 80053 COMPREHEN METABOLIC PANEL: CPT

## 2023-04-14 PROCEDURE — 99283 EMERGENCY DEPT VISIT LOW MDM: CPT

## 2023-04-14 ASSESSMENT — PAIN - FUNCTIONAL ASSESSMENT: PAIN_FUNCTIONAL_ASSESSMENT: NONE - DENIES PAIN

## 2023-04-14 NOTE — ED PROVIDER NOTES
Emergency Department Provider Note                   PCP:                FERNANDO Demarco - ARSLAN               Age: 59 y.o. Sex: female     DISPOSITION Decision To Discharge 04/14/2023 05:52:22 PM       ICD-10-CM    1. Thrombocytopenia ([facility] [address]  D69.6 [facility] - Ezequiel Salinas MD, Hematology & Oncology, [facility] DECISION MAKING  Complexity of Problems Addressed:  Complexity of Problem: 1 acute, uncomplicated illness or injury. Data Reviewed and Analyzed:  Category 1:   I reviewed external records: ED visit note from an outside group. I reviewed external records: provider visit note from PCP. I ordered each unique test.  I reviewed the results of each unique test.      Category 3: Discussion of management or test interpretation. In summary, a well-appearing 21-year-old woman presents with thrombocytopenia. Patient is asymptomatic in office today. She is very well-appearing in no distress. She does not have any evidence of fever, tachycardia, hypotension, or any other hemodynamic instability. No evidence of neurologic symptoms. No lymphadenopathy present. Patient's abdomen is nontender to palpation without rebound, guarding, or distention. No evidence of hepatosplenomegaly. She does not have any active bleed. She does not take any blood thinners or heparin to suggest HIT. She did not begin any new medications. CBC revealed a mild thrombocytopenia of 118 at today's visit. Patient's platelets are improving. We will go ahead and place a referral to hematology at today's visit for further evaluation and management. Patient was given signs and symptoms of concern for for which she should present for reevaluation. She will also follow-up with PCP.   Patient verbalized understanding of the plan of care and left the facility in stable condition    Risk of Complications and/or Morbidity of Patient Management:  Shared medical decision making was utilized in creating the patients

## 2023-04-14 NOTE — DISCHARGE INSTRUCTIONS
Your platelets at the visit have increased to 118. We will refer you to a hematologist for further evaluation. But there is no emergency cause for your low platelets at today's visit. please also follow-up with your primary care provider.

## 2023-04-14 NOTE — ED NOTES
I have reviewed discharge instructions with the patient. The patient verbalized understanding. Patient left ED via Discharge Method: ambulatory to Home with self    Opportunity for questions and clarification provided. Patient given 0 scripts. To continue your aftercare when you leave the hospital, you may receive an automated call from our care team to check in on how you are doing. This is a free service and part of our promise to provide the best care and service to meet your aftercare needs.  If you have questions, or wish to unsubscribe from this service please call [phone]. Thank you for Choosing our [facility] Emergency Department.         Erma Vaughn RN  04/14/23 [id number]

## 2023-04-14 NOTE — ED TRIAGE NOTES
Pt arrives ambulatory stating she got blood work done 2 days ago. Pt states platelet count is 66. Previous platelet 669. Pt states her pcp sent her for further evaluation on her platelet count. Pt denies chest pain/sob.  Pt denies fevers at home

## 2023-04-17 ENCOUNTER — HOSPITAL ENCOUNTER (OUTPATIENT)
Dept: MAMMOGRAPHY | Age: 63
Discharge: HOME | End: 2023-04-20
Payer: COMMERCIAL

## 2023-04-17 DIAGNOSIS — Z78.0 POSTMENOPAUSE: ICD-10-CM

## 2023-04-17 PROCEDURE — 77080 DXA BONE DENSITY AXIAL: CPT

## 2023-04-19 DIAGNOSIS — R19.00 PELVIC MASS: Primary | ICD-10-CM

## 2023-04-20 NOTE — RESULT ENCOUNTER NOTE
Report to pt please:  1. Using the World Health Organization criteria, the bone mineral density is  low. Start calcium 1500mg and Vitamin D 2000 units daily  2. Possible calcified pelvic masses. Pelvic radiographs would be  recommended as an initial evaluation. I have placed order for pelvis xray.  Thanks

## 2023-04-25 DIAGNOSIS — N64.89 BREAST ASYMMETRY: Primary | ICD-10-CM

## 2023-04-28 ENCOUNTER — TELEPHONE (OUTPATIENT)
Dept: FAMILY MEDICINE CLINIC | Facility: CLINIC | Age: 63
End: 2023-04-28

## 2023-04-28 ENCOUNTER — HOSPITAL ENCOUNTER (OUTPATIENT)
Dept: GENERAL RADIOLOGY | Age: 63
Discharge: HOME | End: 2023-04-28
Payer: COMMERCIAL

## 2023-04-28 DIAGNOSIS — R19.00 PELVIC MASS: ICD-10-CM

## 2023-04-28 DIAGNOSIS — I87.8 PHLEBOLITH: Primary | ICD-10-CM

## 2023-04-28 PROCEDURE — 72190 X-RAY EXAM OF PELVIS: CPT

## 2023-05-03 ENCOUNTER — TELEPHONE (OUTPATIENT)
Dept: FAMILY MEDICINE CLINIC | Facility: CLINIC | Age: 63
End: 2023-05-03

## 2023-05-03 DIAGNOSIS — R19.00 PELVIC MASS: Primary | ICD-10-CM

## 2023-05-03 NOTE — TELEPHONE ENCOUNTER
Dr. Juan Jose Crain,    Will you please look at this pt.'s pelvic xray? It shows that there are multiple peripheral eggshell type calcifications in the  pelvis. These measure 6 cm, 5 cm, 4 cm, 3 cm, 2.4 cm and several smaller ones. IMPRESSION:  Multiple axial type calcifications which have a benign chronic. .    I assume appearance???    Should I do anything else or leave it alone? Hate to call her with report until I have a plan.    Thanks

## 2023-05-03 NOTE — TELEPHONE ENCOUNTER
Advised pt of Carina-NP message. Leandro Vera, pt did get the breast ultrasound and it was normal per pt.

## 2023-05-04 NOTE — TELEPHONE ENCOUNTER
Spoke with pt, pt states that she got the breast ultrasound done at [facility]).

## 2023-05-05 NOTE — TELEPHONE ENCOUNTER
Spoke with the breast center at [facility] [address] they do not show any order for a breast ultrasound only a homa screening which was done on 4-3-23.

## 2023-05-19 DIAGNOSIS — D69.6 THROMBOCYTOPENIA (HCC): Primary | ICD-10-CM

## 2023-05-19 NOTE — PROGRESS NOTES
New Patient Abstract    Reason for Referral: Thrombocytopenia    Referring Provider: Andrea Juarez PA-C    Primary Care Provider: FERNANDO Pichardo CNP    Family History of Cancer/Hematologic Disorders: Family history is significant for sister with breast cancer. Presenting Symptoms: No relevant physical symptoms reported    Narrative with recent with Results/Procedures/Biopsies and Dates completed: Ms. Radha Gross is a 58-year-old black female with a history of fibroid uterus s/p embolization. She presented to the [facility] ED on 4/14/23 at the request of her PCP for evaluation of acute onset thrombocytopenia. Labs drawn by her PCP on 3/10/23 indicated a normal platelet count of 717; however, repeat CBC on 3/30/23 showed platelet count dropping precipitously to 67. Labs noted that platelets were clumping due to a clotted sample and redraw was suggested. CBC was redrawn on 4/6/23 and platelet count remained low at 76 with results confirmed by smear. CBC was checked once more on 4/13/23 and platelet count had dropped to 60. Assessment in the ED on 4/14/23 noted that patient was asymptomatic and well-appearing. She had no evidence of hepatosplenomegaly and no active bleeding, easy bruising, or petechial rash. She denied taking any blood thinners or heparin. She had not begun any new medications. CBC drawn in the ED revealed a mild thrombocytopenia of 118. Remainder of CBC was significant for MPV 12.4 and lymphocytes 45. Atypical lymphocytes were seen on peripheral review; RBCs demonstrated slight anisocytosis + poikilocytosis; and platelet count was decreased. CMP drawn in the ED was unremarkable with LFTs within normal limits. Patient was discharged from the ED with referral to [facility] for hematology evaluation and treatment of thrombocytopenia.        Latest Reference Range & Units 03/10/23 08:24 03/30/23 14:57 04/06/23 15:02 04/13/23 16:00 04/14/23 16:05   WBC 4.3 - 11.1 K/uL 5.2 5.1 5.5 5.4 5.3   RBC 4.05 -

## 2023-05-22 ENCOUNTER — OFFICE VISIT (OUTPATIENT)
Dept: ONCOLOGY | Age: 63
End: 2023-05-22
Payer: COMMERCIAL

## 2023-05-22 ENCOUNTER — HOSPITAL ENCOUNTER (OUTPATIENT)
Dept: CT IMAGING | Age: 63
Discharge: HOME | End: 2023-05-25
Payer: COMMERCIAL

## 2023-05-22 ENCOUNTER — HOSPITAL ENCOUNTER (OUTPATIENT)
Dept: LAB | Age: 63
Discharge: HOME | End: 2023-05-25
Payer: COMMERCIAL

## 2023-05-22 VITALS
BODY MASS INDEX: 21.07 KG/M2 | HEART RATE: 72 BPM | TEMPERATURE: 97.6 F | HEIGHT: 68 IN | SYSTOLIC BLOOD PRESSURE: 156 MMHG | RESPIRATION RATE: 14 BRPM | DIASTOLIC BLOOD PRESSURE: 91 MMHG | OXYGEN SATURATION: 100 % | WEIGHT: 139 LBS

## 2023-05-22 DIAGNOSIS — R19.00 PELVIC MASS: ICD-10-CM

## 2023-05-22 DIAGNOSIS — D69.6 THROMBOCYTOPENIA (HCC): Primary | ICD-10-CM

## 2023-05-22 DIAGNOSIS — D69.6 THROMBOCYTOPENIA (HCC): ICD-10-CM

## 2023-05-22 LAB
ALBUMIN SERPL-MCNC: 3.8 G/DL (ref 3.2–4.6)
ALBUMIN/GLOB SERPL: 1 (ref 0.4–1.6)
ALP SERPL-CCNC: 85 U/L (ref 50–136)
ALT SERPL-CCNC: 21 U/L (ref 12–65)
ANION GAP SERPL CALC-SCNC: 4 MMOL/L (ref 2–11)
AST SERPL-CCNC: 16 U/L (ref 15–37)
BASOPHILS # BLD: 0.1 K/UL (ref 0–0.2)
BASOPHILS NFR BLD: 1 % (ref 0–2)
BILIRUB SERPL-MCNC: 0.6 MG/DL (ref 0.2–1.1)
BUN SERPL-MCNC: 14 MG/DL (ref 8–23)
CALCIUM SERPL-MCNC: 9.7 MG/DL (ref 8.3–10.4)
CHLORIDE SERPL-SCNC: 111 MMOL/L (ref 101–110)
CO2 SERPL-SCNC: 30 MMOL/L (ref 21–32)
CREAT BLD-MCNC: 0.83 MG/DL (ref 0.8–1.5)
CREAT SERPL-MCNC: 0.9 MG/DL (ref 0.6–1)
DIFFERENTIAL METHOD BLD: ABNORMAL
EOSINOPHIL # BLD: 0.1 K/UL (ref 0–0.8)
EOSINOPHIL NFR BLD: 2 % (ref 0.5–7.8)
ERYTHROCYTE [DISTWIDTH] IN BLOOD BY AUTOMATED COUNT: 12.6 % (ref 11.9–14.6)
GLOBULIN SER CALC-MCNC: 3.8 G/DL (ref 2.8–4.5)
GLUCOSE SERPL-MCNC: 74 MG/DL (ref 65–100)
HCT VFR BLD AUTO: 43.6 % (ref 35.8–46.3)
HGB BLD-MCNC: 13.4 G/DL (ref 11.7–15.4)
IMM GRANULOCYTES # BLD AUTO: 0 K/UL (ref 0–0.5)
IMM GRANULOCYTES NFR BLD AUTO: 0 % (ref 0–5)
LYMPHOCYTES # BLD: 2.3 K/UL (ref 0.5–4.6)
LYMPHOCYTES NFR BLD: 42 % (ref 13–44)
MCH RBC QN AUTO: 30 PG (ref 26.1–32.9)
MCHC RBC AUTO-ENTMCNC: 30.7 G/DL (ref 31.4–35)
MCV RBC AUTO: 97.8 FL (ref 82–102)
MONOCYTES # BLD: 0.3 K/UL (ref 0.1–1.3)
MONOCYTES NFR BLD: 5 % (ref 4–12)
NEUTS SEG # BLD: 2.8 K/UL (ref 1.7–8.2)
NEUTS SEG NFR BLD: 50 % (ref 43–78)
NRBC # BLD: 0 K/UL (ref 0–0.2)
PLATELET # BLD AUTO: 160 K/UL (ref 150–450)
PLATELET # BLD AUTO: 185 K/UL (ref 150–450)
PMV BLD AUTO: 10 FL (ref 9.4–12.3)
POTASSIUM SERPL-SCNC: 4.1 MMOL/L (ref 3.5–5.1)
PROT SERPL-MCNC: 7.6 G/DL (ref 6.3–8.2)
RBC # BLD AUTO: 4.46 M/UL (ref 4.05–5.2)
SODIUM SERPL-SCNC: 145 MMOL/L (ref 133–143)
WBC # BLD AUTO: 5.5 K/UL (ref 4.3–11.1)

## 2023-05-22 PROCEDURE — 36415 COLL VENOUS BLD VENIPUNCTURE: CPT

## 2023-05-22 PROCEDURE — 85025 COMPLETE CBC W/AUTO DIFF WBC: CPT

## 2023-05-22 PROCEDURE — 85049 AUTOMATED PLATELET COUNT: CPT

## 2023-05-22 PROCEDURE — 80053 COMPREHEN METABOLIC PANEL: CPT

## 2023-05-22 PROCEDURE — 6360000004 HC RX CONTRAST MEDICATION: Performed by: NURSE PRACTITIONER

## 2023-05-22 PROCEDURE — 82565 ASSAY OF CREATININE: CPT

## 2023-05-22 PROCEDURE — 99244 OFF/OP CNSLTJ NEW/EST MOD 40: CPT | Performed by: INTERNAL MEDICINE

## 2023-05-22 PROCEDURE — 74177 CT ABD & PELVIS W/CONTRAST: CPT

## 2023-05-22 RX ORDER — 0.9 % SODIUM CHLORIDE 0.9 %
100 INTRAVENOUS SOLUTION INTRAVENOUS
Status: DISCONTINUED | OUTPATIENT
Start: 2023-05-22 | End: 2023-05-26 | Stop reason: HOSPADM

## 2023-05-22 RX ADMIN — IOPAMIDOL 100 ML: 755 INJECTION, SOLUTION INTRAVENOUS at 15:52

## 2023-05-22 RX ADMIN — DIATRIZOATE MEGLUMINE AND DIATRIZOATE SODIUM 15 ML: 660; 100 LIQUID ORAL; RECTAL at 15:51

## 2023-05-22 ASSESSMENT — PATIENT HEALTH QUESTIONNAIRE - PHQ9
SUM OF ALL RESPONSES TO PHQ QUESTIONS 1-9: 0
2. FEELING DOWN, DEPRESSED OR HOPELESS: 0
SUM OF ALL RESPONSES TO PHQ QUESTIONS 1-9: 0

## 2023-05-22 NOTE — PROGRESS NOTES
M/uL    Hemoglobin 13.4 11.7 - 15.4 g/dL    Hematocrit 43.6 35.8 - 46.3 %    MCV 97.8 82.0 - 102.0 FL    MCH 30.0 26.1 - 32.9 PG    MCHC 30.7 (L) 31.4 - 35.0 g/dL    RDW 12.6 11.9 - 14.6 %    Platelets 037 309 - 824 K/uL    MPV 10.0 9.4 - 12.3 FL    nRBC 0.00 0.0 - 0.2 K/uL    Differential Type AUTOMATED      Neutrophils % 50 43 - 78 %    Lymphocytes % 42 13 - 44 %    Monocytes % 5 4.0 - 12.0 %    Eosinophils % 2 0.5 - 7.8 %    Basophils % 1 0.0 - 2.0 %    Immature Granulocytes 0 0.0 - 5.0 %    Neutrophils Absolute 2.8 1.7 - 8.2 K/UL    Lymphocytes Absolute 2.3 0.5 - 4.6 K/UL    Monocytes Absolute 0.3 0.1 - 1.3 K/UL    Eosinophils Absolute 0.1 0.0 - 0.8 K/UL    Basophils Absolute 0.1 0.0 - 0.2 K/UL    Absolute Immature Granulocyte 0.0 0.0 - 0.5 K/UL   Comprehensive Metabolic Panel    Collection Time: 05/22/23 10:34 AM   Result Value Ref Range    Sodium 145 (H) 133 - 143 mmol/L    Potassium 4.1 3.5 - 5.1 mmol/L    Chloride 111 (H) 101 - 110 mmol/L    CO2 30 21 - 32 mmol/L    Anion Gap 4 2 - 11 mmol/L    Glucose 74 65 - 100 mg/dL    BUN 14 8 - 23 MG/DL    Creatinine 0.90 0.6 - 1.0 MG/DL    Est, Glom Filt Rate >60 >60 ml/min/1.73m2    Calcium 9.7 8.3 - 10.4 MG/DL    Total Bilirubin 0.6 0.2 - 1.1 MG/DL    ALT 21 12 - 65 U/L    AST 16 15 - 37 U/L    Alk Phosphatase 85 50 - 136 U/L    Total Protein 7.6 6.3 - 8.2 g/dL    Albumin 3.8 3.2 - 4.6 g/dL    Globulin 3.8 2.8 - 4.5 g/dL    Albumin/Globulin Ratio 1.0 0.4 - 1.6     Platelet Count    Collection Time: 05/22/23 10:34 AM   Result Value Ref Range    Platelets 275 983 - 649 K/uL       Imaging:  No results found for this or any previous visit. ASSESSMENT/PLAN:   Diagnosis Orders   1. Thrombocytopenia ([address]          59 y.o. F consulted for acute thrombocytopenia presented to [facility] on 5/22/2023.   She had normal baseline CBC up to 3/2023, and developed acute thrombocytopenia that lasted for about 2 weeks, repeat lab today showed recovery and normal CBC/platelet, consistent

## 2023-05-22 NOTE — PATIENT INSTRUCTIONS
Patient Instructions from Today's Visit    Reason for Visit:  New patient     Plan:  -Your platelet levels are normal today and show complete recovery.   -Sometime unknown infection causes the immune system to flare up which may cause platelet destruction at that time but today your platelets are recovered.   -Continue to follow with PCP and call as needed    Follow Up:  As needed    Recent Lab Results:  Hospital Outpatient Visit on 05/22/2023   Component Date Value Ref Range Status    WBC 05/22/2023 5.5  4.3 - 11.1 K/uL Final    RBC 05/22/2023 4.46  4.05 - 5.2 M/uL Final    Hemoglobin 05/22/2023 13.4  11.7 - 15.4 g/dL Final    Hematocrit 05/22/2023 43.6  35.8 - 46.3 % Final    MCV 05/22/2023 97.8  82.0 - 102.0 FL Final    MCH 05/22/2023 30.0  26.1 - 32.9 PG Final    MCHC 05/22/2023 30.7 (L)  31.4 - 35.0 g/dL Final    RDW 05/22/2023 12.6  11.9 - 14.6 % Final    Platelets 40/32/8226 185  150 - 450 K/uL Final    EDTA     MPV 05/22/2023 10.0  9.4 - 12.3 FL Final    nRBC 05/22/2023 0.00  0.0 - 0.2 K/uL Final    **Note: Absolute NRBC parameter is now reported with Hemogram**    Differential Type 05/22/2023 AUTOMATED    Final    Neutrophils % 05/22/2023 50  43 - 78 % Final    Lymphocytes % 05/22/2023 42  13 - 44 % Final    Monocytes % 05/22/2023 5  4.0 - 12.0 % Final    Eosinophils % 05/22/2023 2  0.5 - 7.8 % Final    Basophils % 05/22/2023 1  0.0 - 2.0 % Final    Immature Granulocytes 05/22/2023 0  0.0 - 5.0 % Final    Neutrophils Absolute 05/22/2023 2.8  1.7 - 8.2 K/UL Final    Lymphocytes Absolute 05/22/2023 2.3  0.5 - 4.6 K/UL Final    Monocytes Absolute 05/22/2023 0.3  0.1 - 1.3 K/UL Final    Eosinophils Absolute 05/22/2023 0.1  0.0 - 0.8 K/UL Final    Basophils Absolute 05/22/2023 0.1  0.0 - 0.2 K/UL Final    Absolute Immature Granulocyte 05/22/2023 0.0  0.0 - 0.5 K/UL Final    Platelets 42/13/3052 160  150 - 450 K/uL Final    Performed on Sodium Citrate Tube         Treatment Summary has been discussed and given

## 2023-05-24 DIAGNOSIS — K63.9 MURAL THICKENING OF COLON: Primary | ICD-10-CM

## 2023-05-24 NOTE — RESULT ENCOUNTER NOTE
To pt. CT revealed:  IMPRESSION:  Diffuse mural thickening of the transverse and left colon. This is  likely inflammatory. I am going to refer her to GI to have this evaluated. If she does not hear from referral within a week, she is to let us know. Very important that she follow through with this.     Thanks

## 2023-06-14 ENCOUNTER — TELEPHONE (OUTPATIENT)
Dept: FAMILY MEDICINE CLINIC | Facility: CLINIC | Age: 63
End: 2023-06-14

## 2023-06-19 NOTE — TELEPHONE ENCOUNTER
Spoke with pt and advised pt again of lab results and gave pt [address] [facility] [address] number.

## 2023-07-24 ENCOUNTER — OFFICE VISIT (OUTPATIENT)
Dept: GASTROENTEROLOGY | Age: 63
End: 2023-07-24
Payer: COMMERCIAL

## 2023-07-24 VITALS
BODY MASS INDEX: 21.22 KG/M2 | HEIGHT: 68 IN | OXYGEN SATURATION: 97 % | DIASTOLIC BLOOD PRESSURE: 64 MMHG | TEMPERATURE: 97.1 F | WEIGHT: 140 LBS | SYSTOLIC BLOOD PRESSURE: 125 MMHG | HEART RATE: 69 BPM | RESPIRATION RATE: 16 BRPM

## 2023-07-24 DIAGNOSIS — R93.3 ABNORMAL COMPUTED TOMOGRAPHY OF LARGE INTESTINE: Primary | ICD-10-CM

## 2023-07-24 PROCEDURE — 99203 OFFICE O/P NEW LOW 30 MIN: CPT | Performed by: INTERNAL MEDICINE

## 2023-07-24 ASSESSMENT — ENCOUNTER SYMPTOMS
ABDOMINAL PAIN: 0
COLOR CHANGE: 0
TROUBLE SWALLOWING: 0
SHORTNESS OF BREATH: 0
BLOOD IN STOOL: 0
VOMITING: 0

## 2023-07-24 NOTE — PROGRESS NOTES
Ozzy Smith (:  1960) is a 59 y.o. female, new patient here for evaluation of the following chief complaint(s): Abnormal CT scan of bowel  New Patient         ASSESSMENT/PLAN:  1. Abnormal computed tomography of large intestine    We discussed the potential side effect from the cephalexin that might have caused cramps and diarrhea and the abnormality noted on the CT scan. Since her symptoms have completely resolved. Patient had had prior negative colonoscopy. Patient elected not to pursue a colonoscopic evaluation at this moment if her symptoms recur she is to call my office       Subjective   SUBJECTIVE/OBJECTIVE  Patient was recently evaluated with a CT scan for abdominal pain localized to the left upper quadrant area. CT scan showed thickening of the wall of the transverse and descending colon. 3 weeks prior to the CT scan patient was given cephalexin for urinary tract infection and ended up with recurrent episode of nausea vomiting diarrhea abdominal pain and secondary thrombocytopenia. Since then her symptoms have completely resolved. Denies abdominal pain fever chills rectal bleeding recently. She had a prior colonoscopy within 5 years. Past Medical History:   Diagnosis Date    Fibroid uterus        Past Surgical History:   Procedure Laterality Date    UTERINE FIBROID EMBOLIZATION               No Known Allergies       Review of Systems   Constitutional:  Negative for appetite change. HENT:  Negative for mouth sores and trouble swallowing. Respiratory:  Negative for shortness of breath. Cardiovascular:  Negative for chest pain. Gastrointestinal:  Negative for abdominal pain, blood in stool and vomiting. Skin:  Negative for color change. Allergic/Immunologic: Negative for food allergies. Neurological:  Negative for seizures and weakness. Hematological:  Does not bruise/bleed easily. Objective   Physical Exam  HENT:      Head: Normocephalic.       Mouth/Throat:

## 2024-03-13 ENCOUNTER — TRANSCRIBE ORDERS (OUTPATIENT)
Dept: SCHEDULING | Age: 64
End: 2024-03-13

## 2024-03-13 DIAGNOSIS — Z12.31 SCREENING MAMMOGRAM FOR HIGH-RISK PATIENT: Primary | ICD-10-CM

## 2024-04-09 ENCOUNTER — HOSPITAL ENCOUNTER (OUTPATIENT)
Dept: MAMMOGRAPHY | Age: 64
Discharge: HOME | End: 2024-04-12
Payer: COMMERCIAL

## 2024-04-09 DIAGNOSIS — Z12.31 SCREENING MAMMOGRAM FOR HIGH-RISK PATIENT: ICD-10-CM

## 2024-04-09 PROCEDURE — 77067 SCR MAMMO BI INCL CAD: CPT

## 2024-04-10 NOTE — RESULT ENCOUNTER NOTE
FINDINGS: No significant change or significant suspicious looking masses,  calcifications or areas of architectural distortion.      IMPRESSION:  No signs of malignancy. Annual screening is recommended.

## 2024-09-03 ASSESSMENT — PATIENT HEALTH QUESTIONNAIRE - PHQ9
SUM OF ALL RESPONSES TO PHQ QUESTIONS 1-9: 0
2. FEELING DOWN, DEPRESSED OR HOPELESS: NOT AT ALL
1. LITTLE INTEREST OR PLEASURE IN DOING THINGS: NOT AT ALL
SUM OF ALL RESPONSES TO PHQ QUESTIONS 1-9: 0
SUM OF ALL RESPONSES TO PHQ QUESTIONS 1-9: 0
SUM OF ALL RESPONSES TO PHQ9 QUESTIONS 1 & 2: 0
2. FEELING DOWN, DEPRESSED OR HOPELESS: NOT AT ALL
1. LITTLE INTEREST OR PLEASURE IN DOING THINGS: NOT AT ALL
SUM OF ALL RESPONSES TO PHQ9 QUESTIONS 1 & 2: 0
SUM OF ALL RESPONSES TO PHQ QUESTIONS 1-9: 0

## 2024-09-04 ENCOUNTER — OFFICE VISIT (OUTPATIENT)
Dept: FAMILY MEDICINE CLINIC | Facility: CLINIC | Age: 64
End: 2024-09-04
Payer: COMMERCIAL

## 2024-09-04 VITALS
DIASTOLIC BLOOD PRESSURE: 60 MMHG | WEIGHT: 141.1 LBS | SYSTOLIC BLOOD PRESSURE: 125 MMHG | BODY MASS INDEX: 22.15 KG/M2 | HEIGHT: 67 IN | HEART RATE: 76 BPM | OXYGEN SATURATION: 100 %

## 2024-09-04 DIAGNOSIS — R73.09 ELEVATED GLUCOSE: ICD-10-CM

## 2024-09-04 DIAGNOSIS — E78.2 MIXED HYPERLIPIDEMIA: ICD-10-CM

## 2024-09-04 DIAGNOSIS — Z00.00 PHYSICAL EXAM: Primary | ICD-10-CM

## 2024-09-04 LAB
ALBUMIN SERPL-MCNC: 3.9 G/DL (ref 3.2–4.6)
ALBUMIN/GLOB SERPL: 1.2 (ref 1–1.9)
ALP SERPL-CCNC: 83 U/L (ref 35–104)
ALT SERPL-CCNC: 15 U/L (ref 12–65)
ANION GAP SERPL CALC-SCNC: 10 MMOL/L (ref 9–18)
AST SERPL-CCNC: 22 U/L (ref 15–37)
BASOPHILS # BLD: 0.1 K/UL (ref 0–0.2)
BASOPHILS NFR BLD: 1 % (ref 0–2)
BILIRUB SERPL-MCNC: 0.6 MG/DL (ref 0–1.2)
BUN SERPL-MCNC: 17 MG/DL (ref 8–23)
CALCIUM SERPL-MCNC: 9.7 MG/DL (ref 8.8–10.2)
CHLORIDE SERPL-SCNC: 103 MMOL/L (ref 98–107)
CHOLEST SERPL-MCNC: 203 MG/DL (ref 0–200)
CO2 SERPL-SCNC: 27 MMOL/L (ref 20–28)
CREAT SERPL-MCNC: 0.88 MG/DL (ref 0.6–1.1)
DIFFERENTIAL METHOD BLD: ABNORMAL
EOSINOPHIL # BLD: 0.1 K/UL (ref 0–0.8)
EOSINOPHIL NFR BLD: 1 % (ref 0.5–7.8)
ERYTHROCYTE [DISTWIDTH] IN BLOOD BY AUTOMATED COUNT: 12.4 % (ref 11.9–14.6)
GLOBULIN SER CALC-MCNC: 3.2 G/DL (ref 2.3–3.5)
GLUCOSE SERPL-MCNC: 67 MG/DL (ref 70–99)
HBA1C MFR BLD: 5.1 %
HCT VFR BLD AUTO: 42.7 % (ref 35.8–46.3)
HDLC SERPL-MCNC: 65 MG/DL (ref 40–60)
HDLC SERPL: 3.1 (ref 0–5)
HGB BLD-MCNC: 13.2 G/DL (ref 11.7–15.4)
IMM GRANULOCYTES # BLD AUTO: 0 K/UL (ref 0–0.5)
IMM GRANULOCYTES NFR BLD AUTO: 0 % (ref 0–5)
LDLC SERPL CALC-MCNC: 115 MG/DL (ref 0–100)
LYMPHOCYTES # BLD: 2.2 K/UL (ref 0.5–4.6)
LYMPHOCYTES NFR BLD: 36 % (ref 13–44)
MCH RBC QN AUTO: 30.3 PG (ref 26.1–32.9)
MCHC RBC AUTO-ENTMCNC: 30.9 G/DL (ref 31.4–35)
MCV RBC AUTO: 98.2 FL (ref 82–102)
MONOCYTES # BLD: 0.4 K/UL (ref 0.1–1.3)
MONOCYTES NFR BLD: 7 % (ref 4–12)
NEUTS SEG # BLD: 3.4 K/UL (ref 1.7–8.2)
NEUTS SEG NFR BLD: 55 % (ref 43–78)
NRBC # BLD: 0 K/UL (ref 0–0.2)
PLATELET # BLD AUTO: 100 K/UL (ref 150–450)
PMV BLD AUTO: 11.6 FL (ref 9.4–12.3)
POTASSIUM SERPL-SCNC: 4.5 MMOL/L (ref 3.5–5.1)
PROT SERPL-MCNC: 7 G/DL (ref 6.3–8.2)
RBC # BLD AUTO: 4.35 M/UL (ref 4.05–5.2)
SODIUM SERPL-SCNC: 140 MMOL/L (ref 136–145)
TRIGL SERPL-MCNC: 115 MG/DL (ref 0–150)
VLDLC SERPL CALC-MCNC: 23 MG/DL (ref 6–23)
WBC # BLD AUTO: 6.2 K/UL (ref 4.3–11.1)

## 2024-09-04 PROCEDURE — 83036 HEMOGLOBIN GLYCOSYLATED A1C: CPT | Performed by: NURSE PRACTITIONER

## 2024-09-04 PROCEDURE — 99396 PREV VISIT EST AGE 40-64: CPT | Performed by: NURSE PRACTITIONER

## 2024-09-04 SDOH — ECONOMIC STABILITY: FOOD INSECURITY: WITHIN THE PAST 12 MONTHS, THE FOOD YOU BOUGHT JUST DIDN'T LAST AND YOU DIDN'T HAVE MONEY TO GET MORE.: PATIENT DECLINED

## 2024-09-04 SDOH — ECONOMIC STABILITY: INCOME INSECURITY: HOW HARD IS IT FOR YOU TO PAY FOR THE VERY BASICS LIKE FOOD, HOUSING, MEDICAL CARE, AND HEATING?: PATIENT DECLINED

## 2024-09-04 SDOH — ECONOMIC STABILITY: FOOD INSECURITY: WITHIN THE PAST 12 MONTHS, YOU WORRIED THAT YOUR FOOD WOULD RUN OUT BEFORE YOU GOT MONEY TO BUY MORE.: PATIENT DECLINED

## 2024-09-04 ASSESSMENT — ENCOUNTER SYMPTOMS
GASTROINTESTINAL NEGATIVE: 1
RESPIRATORY NEGATIVE: 1
ALLERGIC/IMMUNOLOGIC NEGATIVE: 1
EYES NEGATIVE: 1

## 2024-09-04 NOTE — PROGRESS NOTES
[facility] [address]  [place] [address]   (ph) [phone] (fax) [phone]  GLENDY Rocha      Chief Complaint   Patient presents with    Annual Exam    Other     Requesting lipid and A1c labs.       63 yo female comes in for her annual physical. She has a gynecologist and last pap was last year but revealed ASCUS. HPV was negative; therefore, gynecologist recommended repeat in 3 years. Abnormal pap last year in 04/23. This needs to be followed; however, pt never returned. Using the World Health Organization criteria last year , the bone mineral density was  low.She had possible calcified pelvic masses. Pelvic radiograph and CT was done. GI referral was done and Dr. Goodrich felt finding was related to ATB use. Mammogram was recent and normal. Reports she had a colo in [facility] in 2017; repeat in 10 years. Will request copy. She reports taking Calcium and Vitamin D daily.           No Known Allergies    Past Medical History:   Diagnosis Date    Fibroid uterus        Family History   Problem Relation Age of Onset    Stroke Mother     Breast Cancer Sister 60    Hypertension Brother     Colon Cancer Neg Hx     Ovarian Cancer Neg Hx        Social History     Socioeconomic History    Marital status:      Spouse name: Not on file    Number of children: Not on file    Years of education: Not on file    Highest education level: Not on file   Occupational History    Not on file   Tobacco Use    Smoking status: Never     Passive exposure: Never    Smokeless tobacco: Never   Vaping Use    Vaping status: Never Used   Substance and Sexual Activity    Alcohol use: Yes     Comment: social    Drug use: Never    Sexual activity: Yes     Partners: Male   Other Topics Concern    Not on file   Social History Narrative    Not on file     Social Determinants of Health     Financial Resource Strain: Patient Declined (9/4/2024)    Overall Financial Resource

## 2024-09-05 DIAGNOSIS — D69.6 THROMBOCYTOPENIA (HCC): Primary | ICD-10-CM

## 2024-09-23 DIAGNOSIS — D69.6 THROMBOCYTOPENIA (HCC): ICD-10-CM

## 2024-09-23 LAB
BASOPHILS # BLD: 0 K/UL (ref 0–0.2)
BASOPHILS NFR BLD: 1 % (ref 0–2)
DIFFERENTIAL METHOD BLD: ABNORMAL
EOSINOPHIL # BLD: 0.1 K/UL (ref 0–0.8)
EOSINOPHIL NFR BLD: 2 % (ref 0.5–7.8)
ERYTHROCYTE [DISTWIDTH] IN BLOOD BY AUTOMATED COUNT: 12.6 % (ref 11.9–14.6)
HCT VFR BLD AUTO: 40.3 % (ref 35.8–46.3)
HGB BLD-MCNC: 12.3 G/DL (ref 11.7–15.4)
IMM GRANULOCYTES # BLD AUTO: 0 K/UL (ref 0–0.5)
IMM GRANULOCYTES NFR BLD AUTO: 0 % (ref 0–5)
LYMPHOCYTES # BLD: 2.1 K/UL (ref 0.5–4.6)
LYMPHOCYTES NFR BLD: 44 % (ref 13–44)
MCH RBC QN AUTO: 30.6 PG (ref 26.1–32.9)
MCHC RBC AUTO-ENTMCNC: 30.5 G/DL (ref 31.4–35)
MCV RBC AUTO: 100.2 FL (ref 82–102)
MONOCYTES # BLD: 0.4 K/UL (ref 0.1–1.3)
MONOCYTES NFR BLD: 8 % (ref 4–12)
NEUTS SEG # BLD: 2.1 K/UL (ref 1.7–8.2)
NEUTS SEG NFR BLD: 45 % (ref 43–78)
NRBC # BLD: 0 K/UL (ref 0–0.2)
PLATELET # BLD AUTO: 90 K/UL (ref 150–450)
PMV BLD AUTO: 10.8 FL (ref 9.4–12.3)
RBC # BLD AUTO: 4.02 M/UL (ref 4.05–5.2)
WBC # BLD AUTO: 4.7 K/UL (ref 4.3–11.1)

## 2025-03-17 ENCOUNTER — TRANSCRIBE ORDERS (OUTPATIENT)
Dept: SCHEDULING | Age: 65
End: 2025-03-17

## 2025-03-17 DIAGNOSIS — Z12.31 VISIT FOR SCREENING MAMMOGRAM: Primary | ICD-10-CM

## 2025-04-15 ENCOUNTER — HOSPITAL ENCOUNTER (OUTPATIENT)
Dept: MAMMOGRAPHY | Age: 65
Discharge: HOME | End: 2025-04-18
Payer: COMMERCIAL

## 2025-04-15 VITALS — BODY MASS INDEX: 22.13 KG/M2 | HEIGHT: 67 IN | WEIGHT: 141 LBS

## 2025-04-15 DIAGNOSIS — Z12.31 VISIT FOR SCREENING MAMMOGRAM: ICD-10-CM

## 2025-04-15 PROCEDURE — 77067 SCR MAMMO BI INCL CAD: CPT

## 2025-04-22 ENCOUNTER — RESULTS FOLLOW-UP (OUTPATIENT)
Dept: FAMILY MEDICINE CLINIC | Facility: CLINIC | Age: 65
End: 2025-04-22

## 2025-04-22 NOTE — RESULT ENCOUNTER NOTE
IMPRESSION:  No mammographic evidence of malignancy. Annual screening is  recommended in 1 year, or significant indicated.  Thanks